# Patient Record
Sex: MALE | Race: WHITE | NOT HISPANIC OR LATINO | Employment: PART TIME | URBAN - NONMETROPOLITAN AREA
[De-identification: names, ages, dates, MRNs, and addresses within clinical notes are randomized per-mention and may not be internally consistent; named-entity substitution may affect disease eponyms.]

---

## 2017-05-30 ENCOUNTER — APPOINTMENT (OUTPATIENT)
Dept: GENERAL RADIOLOGY | Facility: HOSPITAL | Age: 27
End: 2017-05-30

## 2017-05-30 ENCOUNTER — HOSPITAL ENCOUNTER (EMERGENCY)
Facility: HOSPITAL | Age: 27
Discharge: HOME | End: 2017-05-30
Attending: FAMILY MEDICINE | Admitting: NURSE PRACTITIONER

## 2017-05-30 VITALS
BODY MASS INDEX: 26.11 KG/M2 | HEIGHT: 75 IN | HEART RATE: 80 BPM | RESPIRATION RATE: 18 BRPM | OXYGEN SATURATION: 98 % | WEIGHT: 210 LBS | SYSTOLIC BLOOD PRESSURE: 120 MMHG | DIASTOLIC BLOOD PRESSURE: 78 MMHG | TEMPERATURE: 97.4 F

## 2017-05-30 DIAGNOSIS — M79.672 LEFT FOOT PAIN: Primary | ICD-10-CM

## 2017-05-30 PROCEDURE — 73630 X-RAY EXAM OF FOOT: CPT

## 2017-05-30 PROCEDURE — 99283 EMERGENCY DEPT VISIT LOW MDM: CPT

## 2017-05-30 RX ORDER — NAPROXEN 500 MG/1
500 TABLET ORAL 2 TIMES DAILY PRN
Qty: 20 TABLET | Refills: 0 | OUTPATIENT
Start: 2017-05-30 | End: 2019-10-17

## 2018-07-11 ENCOUNTER — HOSPITAL ENCOUNTER (EMERGENCY)
Facility: HOSPITAL | Age: 28
Discharge: HOME | End: 2018-07-11
Attending: EMERGENCY MEDICINE | Admitting: EMERGENCY MEDICINE

## 2018-07-11 ENCOUNTER — APPOINTMENT (OUTPATIENT)
Dept: GENERAL RADIOLOGY | Facility: HOSPITAL | Age: 28
End: 2018-07-11

## 2018-07-11 VITALS
DIASTOLIC BLOOD PRESSURE: 83 MMHG | OXYGEN SATURATION: 98 % | RESPIRATION RATE: 20 BRPM | HEART RATE: 85 BPM | WEIGHT: 190 LBS | SYSTOLIC BLOOD PRESSURE: 158 MMHG | TEMPERATURE: 98 F | BODY MASS INDEX: 24.38 KG/M2 | HEIGHT: 74 IN

## 2018-07-11 DIAGNOSIS — S62.625A CLOSED DISPLACED FRACTURE OF MIDDLE PHALANX OF LEFT RING FINGER, INITIAL ENCOUNTER: ICD-10-CM

## 2018-07-11 DIAGNOSIS — S62.657A CLOSED NONDISPLACED FRACTURE OF MIDDLE PHALANX OF LEFT LITTLE FINGER, INITIAL ENCOUNTER: Primary | ICD-10-CM

## 2018-07-11 PROCEDURE — 73090 X-RAY EXAM OF FOREARM: CPT

## 2018-07-11 PROCEDURE — 99283 EMERGENCY DEPT VISIT LOW MDM: CPT

## 2018-07-11 PROCEDURE — 73130 X-RAY EXAM OF HAND: CPT

## 2018-07-11 PROCEDURE — 73110 X-RAY EXAM OF WRIST: CPT

## 2018-07-11 RX ORDER — HYDROCODONE BITARTRATE AND ACETAMINOPHEN 7.5; 325 MG/1; MG/1
1 TABLET ORAL ONCE
Status: COMPLETED | OUTPATIENT
Start: 2018-07-11 | End: 2018-07-11

## 2018-07-11 RX ORDER — HYDROCODONE BITARTRATE AND ACETAMINOPHEN 7.5; 325 MG/1; MG/1
1 TABLET ORAL EVERY 6 HOURS PRN
Qty: 12 TABLET | Refills: 0 | Status: SHIPPED | OUTPATIENT
Start: 2018-07-11 | End: 2018-07-14

## 2018-07-11 RX ADMIN — HYDROCODONE BITARTRATE AND ACETAMINOPHEN 1 TABLET: 7.5; 325 TABLET ORAL at 13:25

## 2018-07-11 NOTE — ED PROVIDER NOTES
Subjective     History provided by:  Patient   used: No    Hand Injury   Location:  Hand and wrist  Wrist location:  L wrist  Hand location:  L hand and dorsum of L hand  Injury: yes    Time since incident:  1 day  Mechanism of injury: crush    Crush injury:     Mechanism:  Falling object (rock)    Approximate weight of object:  Unknown  Handedness:  Right-handed  Dislocation: no    Foreign body present:  No foreign bodies  Tetanus status:  Up to date  Prior injury to area:  No  Relieved by:  Nothing  Worsened by:  Movement  Ineffective treatments:  None tried  Associated symptoms: decreased range of motion, stiffness and swelling    Associated symptoms: no back pain, no fatigue, no fever, no muscle weakness, no neck pain, no numbness and no tingling    Risk factors: no concern for non-accidental trauma, no known bone disorder, no frequent fractures and no recent illness        Review of Systems   Constitutional: Negative.  Negative for fatigue and fever.   HENT: Negative.    Eyes: Negative.    Respiratory: Negative.    Cardiovascular: Negative.    Gastrointestinal: Negative.    Endocrine: Negative.    Genitourinary: Negative.    Musculoskeletal: Positive for stiffness. Negative for arthralgias, back pain, gait problem, joint swelling, myalgias, neck pain and neck stiffness.        Left hand and finger pain   Skin: Negative.    Allergic/Immunologic: Negative.    Neurological: Negative.    Hematological: Negative.    Psychiatric/Behavioral: Negative.        History reviewed. No pertinent past medical history.    Allergies   Allergen Reactions   • Penicillins Itching       Past Surgical History:   Procedure Laterality Date   • APPENDECTOMY     • MYRINGOTOMY W/ TUBES         History reviewed. No pertinent family history.    Social History     Social History   • Marital status: Single     Social History Main Topics   • Smoking status: Current Every Day Smoker     Packs/day: 1.00     Years: 10.00   •  Smokeless tobacco: Former User   • Alcohol use Yes      Comment: socially   • Drug use: No   • Sexual activity: Defer     Other Topics Concern   • Not on file           Objective   Physical Exam   Constitutional: He is oriented to person, place, and time. He appears well-developed and well-nourished. No distress.   HENT:   Head: Normocephalic and atraumatic.   Mouth/Throat: No oropharyngeal exudate.   Eyes: Conjunctivae and EOM are normal. Pupils are equal, round, and reactive to light. Right eye exhibits no discharge. Left eye exhibits no discharge. No scleral icterus.   Neck: Normal range of motion. Neck supple. No JVD present. No tracheal deviation present. No thyromegaly present.   Cardiovascular: Normal rate, regular rhythm, normal heart sounds and intact distal pulses.  Exam reveals no gallop and no friction rub.    No murmur heard.  Pulmonary/Chest: Effort normal and breath sounds normal. No stridor. No respiratory distress. He has no wheezes. He has no rales. He exhibits no tenderness.   Abdominal: Soft. Bowel sounds are normal. He exhibits no distension and no mass. There is no tenderness. There is no rebound and no guarding. No hernia.   Musculoskeletal: He exhibits tenderness. He exhibits no edema or deformity.        Left hand: He exhibits decreased range of motion, tenderness, bony tenderness and swelling. He exhibits normal two-point discrimination, normal capillary refill, no deformity and no laceration. Normal sensation noted. Decreased sensation is not present in the ulnar distribution, is not present in the medial redistribution and is not present in the radial distribution. Normal strength noted. He exhibits no finger abduction, no thumb/finger opposition and no wrist extension trouble.        Hands:  Lymphadenopathy:     He has no cervical adenopathy.   Neurological: He is alert and oriented to person, place, and time. He has normal reflexes. He displays normal reflexes. No cranial nerve deficit  or sensory deficit. He exhibits normal muscle tone. Coordination normal.   Skin: Skin is warm and dry. Capillary refill takes less than 2 seconds. No rash noted. He is not diaphoretic. No erythema. No pallor.   Psychiatric: He has a normal mood and affect. His behavior is normal. Judgment and thought content normal.   Nursing note and vitals reviewed.      Procedures           ED Course      Xr Forearm 2 View Left    Result Date: 7/11/2018  Narrative: Left forearm two view on 7/11/2018 CLINICAL INDICATION: Crush injury at work, pain COMPARISON: None FINDINGS: There are no fractures. Visualized joints are well aligned. No joint effusion is noted in the elbow to suggest an occult fracture. No bony abnormality is noted.     Impression: No acute bony abnormality. Electronically signed by:  Azeem Ramírez  7/11/2018 12:38 PM CDT Workstation: [id number]    Xr Wrist 3+ View Left    Result Date: 7/11/2018  Narrative: Left wrist four view on 7/11/2018 CLINICAL INDICATION: Crush injury at work, wrist pain COMPARISON: None FINDINGS: There are no fractures. Visualized joints are well aligned. No bony abnormality is noted.     Impression: No acute bony abnormality. Electronically signed by:  Azeem Ramírez  7/11/2018 12:37 PM CDT Workstation: [id number]    Xr Hand 3+ View Left    Result Date: 7/11/2018  Narrative: Left hand three view on 7/11/2018 CLINICAL INDICATION: Left hand pain after crush injury COMPARISON: None FINDINGS: There is an age-indeterminate but possibly acute intra-articular fracture involving the volar base of the fifth middle phalanx that is minimally displaced. There is likely acute slightly comminuted transverse fracture of the metaphyseal base of the fourth middle phalanx extending into the volar plate. This fracture is not well visualized on lateral view. No other fracture is noted. Visualized joints are well aligned.     Impression: Age-indeterminate but possibly both acute fractures of the  base of the fourth and fifth middle phalanx. Recommend correlation with location of pain and if indicated repeat lateral views with the fingers extended would be useful. Electronically signed by:  Azeem Ramírez  7/11/2018 12:43 PM CDT Workstation: [id number]                [facility]      Final diagnoses:   Closed nondisplaced fracture of middle phalanx of left little finger, initial encounter   Closed displaced fracture of middle phalanx of left ring finger, initial encounter            DEMETRIA Mendoza  07/11/18 3994

## 2018-07-18 ENCOUNTER — OFFICE VISIT (OUTPATIENT)
Dept: ORTHOPEDIC SURGERY | Facility: CLINIC | Age: 28
End: 2018-07-18

## 2018-07-18 VITALS — HEIGHT: 74 IN | BODY MASS INDEX: 24.38 KG/M2 | WEIGHT: 190 LBS

## 2018-07-18 DIAGNOSIS — M79.642 LEFT HAND PAIN: Primary | ICD-10-CM

## 2018-07-18 DIAGNOSIS — S62.629A CLOSED FRACTURE OF BASE OF MIDDLE PHALANX OF FINGER, INITIAL ENCOUNTER: ICD-10-CM

## 2018-07-18 PROBLEM — S62.621A: Status: ACTIVE | Noted: 2018-07-18

## 2018-07-18 PROCEDURE — 99203 OFFICE O/P NEW LOW 30 MIN: CPT | Performed by: ORTHOPAEDIC SURGERY

## 2018-07-18 PROCEDURE — 26720 TREAT FINGER FRACTURE EACH: CPT | Performed by: ORTHOPAEDIC SURGERY

## 2018-07-18 RX ORDER — HYDROCODONE BITARTRATE AND ACETAMINOPHEN 5; 325 MG/1; MG/1
1 TABLET ORAL EVERY 6 HOURS PRN
Qty: 30 TABLET | Refills: 0 | OUTPATIENT
Start: 2018-07-18 | End: 2019-10-17

## 2018-07-18 NOTE — PROGRESS NOTES
"Thomas Ge is a 27 y.o. male   Primary provider:  No Known Provider       Chief Complaint   Patient presents with   • Left Hand - Numbness, Pain       HISTORY OF PRESENT ILLNESS: Patient presents as ER follow up- injury to left hand. Date of injury- 7/10/2018. Patient states injury occurred after accidently crushing his hand in between 2 rocks while at work.  Patient states he has continued to have severe pain in his wrist, hand and fingers. Patient states he notices upon occasion there is numbness present. Patient was given naproxen by the ER which he states \"does not provide any pain relief\". Patient has xray's available in chart for review. He was treated in a splint.  History of fracture when he was 12 years old.    History of Present Illness     CONCURRENT MEDICAL HISTORY:    History reviewed. No pertinent past medical history.    Allergies   Allergen Reactions   • Penicillins Itching         Current Outpatient Prescriptions:   •  HYDROcodone-acetaminophen (NORCO) 5-325 MG per tablet, Take 1 tablet by mouth Every 6 (Six) Hours As Needed for Moderate Pain ., Disp: 30 tablet, Rfl: 0  •  naproxen (NAPROSYN) 500 MG tablet, Take 1 tablet by mouth 2 (Two) Times a Day As Needed for Mild Pain (1-3)., Disp: 20 tablet, Rfl: 0    Past Surgical History:   Procedure Laterality Date   • APPENDECTOMY     • MYRINGOTOMY W/ TUBES         History reviewed. No pertinent family history.     Social History     Social History   • Marital status: Single     Spouse name: N/A   • Number of children: N/A   • Years of education: N/A     Occupational History   • Not on file.     Social History Main Topics   • Smoking status: Current Every Day Smoker     Packs/day: 1.00     Years: 10.00   • Smokeless tobacco: Former User   • Alcohol use Yes      Comment: socially   • Drug use: No   • Sexual activity: Defer     Other Topics Concern   • Not on file     Social History Narrative   • No narrative on file        Review of Systems " "  Constitutional: Positive for activity change. Negative for chills and fever.   HENT: Negative.  Negative for facial swelling.    Eyes: Negative.  Negative for photophobia.   Respiratory: Negative.  Negative for apnea and shortness of breath.    Cardiovascular: Negative.  Negative for chest pain and leg swelling.   Gastrointestinal: Negative.  Negative for abdominal pain, nausea and vomiting.   Endocrine: Negative.    Genitourinary: Negative.  Negative for dysuria.   Musculoskeletal: Positive for joint swelling.   Skin: Negative.  Negative for color change and rash.   Allergic/Immunologic: Negative.    Neurological: Positive for numbness. Negative for seizures and syncope.   Hematological: Negative.    Psychiatric/Behavioral: Negative.  Negative for behavioral problems and dysphoric mood.       PHYSICAL EXAMINATION:       Ht 188 cm (74\")   Wt 86.2 kg (190 lb)   BMI 24.39 kg/m²     Physical Exam   Constitutional: He is oriented to person, place, and time. He appears well-developed and well-nourished.   HENT:   Head: Normocephalic and atraumatic.   Eyes: Pupils are equal, round, and reactive to light. EOM are normal.   Neck: Neck supple. No tracheal deviation present.   Pulmonary/Chest: Effort normal.   Musculoskeletal: He exhibits edema and tenderness. He exhibits no deformity.   Neurological: He is alert and oriented to person, place, and time.   Skin: Skin is warm and dry. No erythema.   Psychiatric: He has a normal mood and affect. Thought content normal.   Vitals reviewed.      GAIT:     [x]  Normal  []  Antalgic    Assistive device: [x]  None  []  Walker     []  Crutches  []  Cane     []  Wheelchair  []  Stretcher    Ortho Exam  Tender PIP joints over the fourth and fifth.  Decreased motion.  He is neurologically intact distally at this point.  He does have some tenderness to wrist and some ecchymosis about the fourth finger.  No obvious deformity.    Xr Forearm 2 View Left    Result Date: " 7/11/2018  Narrative: Left forearm two view on 7/11/2018 CLINICAL INDICATION: Crush injury at work, pain COMPARISON: None FINDINGS: There are no fractures. Visualized joints are well aligned. No joint effusion is noted in the elbow to suggest an occult fracture. No bony abnormality is noted.     Impression: No acute bony abnormality. Electronically signed by:  Azeem Ramírez  7/11/2018 12:38 PM CDT Workstation: [id number]    Xr Wrist 3+ View Left    Result Date: 7/11/2018  Narrative: Left wrist four view on 7/11/2018 CLINICAL INDICATION: Crush injury at work, wrist pain COMPARISON: None FINDINGS: There are no fractures. Visualized joints are well aligned. No bony abnormality is noted.     Impression: No acute bony abnormality. Electronically signed by:  Azeem Ramírez  7/11/2018 12:37 PM CDT Workstation: [id number]    Xr Hand 3+ View Left    Result Date: 7/11/2018  Narrative: Left hand three view on 7/11/2018 CLINICAL INDICATION: Left hand pain after crush injury COMPARISON: None FINDINGS: There is an age-indeterminate but possibly acute intra-articular fracture involving the volar base of the fifth middle phalanx that is minimally displaced. There is likely acute slightly comminuted transverse fracture of the metaphyseal base of the fourth middle phalanx extending into the volar plate. This fracture is not well visualized on lateral view. No other fracture is noted. Visualized joints are well aligned.     Impression: Age-indeterminate but possibly both acute fractures of the base of the fourth and fifth middle phalanx. Recommend correlation with location of pain and if indicated repeat lateral views with the fingers extended would be useful. Electronically signed by:  Azeem Ramírez  7/11/2018 12:43 PM CDT Workstation: [id number]        I reviewed the x-rays as above and agree with the findings  ASSESSMENT:    Diagnoses and all orders for this visit:    Left hand pain  -      HYDROcodone-acetaminophen (NORCO) 5-325 MG per tablet; Take 1 tablet by mouth Every 6 (Six) Hours As Needed for Moderate Pain .    Closed fracture of base of middle phalanx of finger, initial encounter          PLAN he has fractures of the fourth and fifth middle phalanx.  There are intra-articularly but had minimal displacement at this point.  I will remove the arm gutter splint that he has buddy tape these and start range of motion.  He has a lot of swelling of explaining the biggest problems were to be stiffness with . we x-rayed these fingers in 2 weeks.  He will call sooner with any problems.  I will give him a work note keeping him off work until that time.  He is left-hand dominant.    No Follow-up on file.    Chacorta Arenas MD

## 2018-07-31 DIAGNOSIS — S62.629A CLOSED FRACTURE OF BASE OF MIDDLE PHALANX OF FINGER, INITIAL ENCOUNTER: Primary | ICD-10-CM

## 2018-08-01 ENCOUNTER — OFFICE VISIT (OUTPATIENT)
Dept: ORTHOPEDIC SURGERY | Facility: CLINIC | Age: 28
End: 2018-08-01

## 2018-08-01 DIAGNOSIS — M79.642 LEFT HAND PAIN: Primary | ICD-10-CM

## 2018-08-01 DIAGNOSIS — S62.629D CLOSED FRACTURE OF BASE OF MIDDLE PHALANX OF FINGER WITH ROUTINE HEALING, SUBSEQUENT ENCOUNTER: ICD-10-CM

## 2018-08-01 DIAGNOSIS — S62.621A DISPLACED FRACTURE OF MIDDLE PHALANX OF LEFT INDEX FINGER, INITIAL ENCOUNTER FOR CLOSED FRACTURE: ICD-10-CM

## 2018-08-01 PROCEDURE — 99024 POSTOP FOLLOW-UP VISIT: CPT | Performed by: ORTHOPAEDIC SURGERY

## 2018-08-01 NOTE — PROGRESS NOTES
Thomas Ge is a 27 y.o. male returns for     Chief Complaint   Patient presents with   • Left Wrist - Pain   • Left Hand - Pain       HISTORY OF PRESENT ILLNESS: Patient presents for recheck- medial phalanx FX of left middle finger. Date of injury- 07/11/2018. Patient states he has followed all directives given since his last office visit. Patient was sent to [facility] upon arrival. He is now 3 weeks out doing pretty well his motion has increased since his crush injury to his hand.       CONCURRENT MEDICAL HISTORY:    History reviewed. No pertinent past medical history.    Allergies   Allergen Reactions   • Penicillins Itching         Current Outpatient Prescriptions:   •  HYDROcodone-acetaminophen (NORCO) 5-325 MG per tablet, Take 1 tablet by mouth Every 6 (Six) Hours As Needed for Moderate Pain ., Disp: 30 tablet, Rfl: 0  •  naproxen (NAPROSYN) 500 MG tablet, Take 1 tablet by mouth 2 (Two) Times a Day As Needed for Mild Pain (1-3)., Disp: 20 tablet, Rfl: 0    Past Surgical History:   Procedure Laterality Date   • APPENDECTOMY     • MYRINGOTOMY W/ TUBES         ROS  No fevers or chills.  No chest pain or shortness of air.  No GI or  disturbances.    PHYSICAL EXAMINATION:       There were no vitals taken for this visit.    Physical Exam    GAIT:     [x]  Normal  []  Antalgic    Assistive device: [x]  None  []  Walker     []  Crutches  []  Cane     []  Wheelchair  []  Stretcher    Ortho Exam  Stiffness and tenderness about the PIP of the fourth and fifth finger on the left hand.  Still has some tenderness about the wrist.  The wrist is moving pretty well he does have a little flexion deformity of the fourth PIP limited motion of the fifth.  He is neurovascularly intact.    Xr Forearm 2 View Left    Result Date: 7/11/2018  Narrative: Left forearm two view on 7/11/2018 CLINICAL INDICATION: Crush injury at work, pain COMPARISON: None FINDINGS: There are no fractures. Visualized joints are well aligned. No joint  effusion is noted in the elbow to suggest an occult fracture. No bony abnormality is noted.     Impression: No acute bony abnormality. Electronically signed by:  Azeem Ramírez  7/11/2018 12:38 PM CDT Workstation: [id number]    Xr Wrist 3+ View Left    Result Date: 7/11/2018  Narrative: Left wrist four view on 7/11/2018 CLINICAL INDICATION: Crush injury at work, wrist pain COMPARISON: None FINDINGS: There are no fractures. Visualized joints are well aligned. No bony abnormality is noted.     Impression: No acute bony abnormality. Electronically signed by:  Azeem Ramírez  7/11/2018 12:37 PM CDT Workstation: [id number]    Xr Hand 3+ View Left    Result Date: 7/11/2018  Narrative: Left hand three view on 7/11/2018 CLINICAL INDICATION: Left hand pain after crush injury COMPARISON: None FINDINGS: There is an age-indeterminate but possibly acute intra-articular fracture involving the volar base of the fifth middle phalanx that is minimally displaced. There is likely acute slightly comminuted transverse fracture of the metaphyseal base of the fourth middle phalanx extending into the volar plate. This fracture is not well visualized on lateral view. No other fracture is noted. Visualized joints are well aligned.     Impression: Age-indeterminate but possibly both acute fractures of the base of the fourth and fifth middle phalanx. Recommend correlation with location of pain and if indicated repeat lateral views with the fingers extended would be useful. Electronically signed by:  Azeem Ramírez  7/11/2018 12:43 PM CDT Workstation: [id number]    Repeat x-rays show intra-articular fracture middle phalanx of the PIP joint of the small finger.  The fracture of the fourth finger does have some angulation to it but it doesn't appear to be is intra-articular.  It may have changed a little bit from previously.        ASSESSMENT:    Diagnoses and all orders for this visit:    Left hand pain    Closed fracture of  base of middle phalanx of finger with routine healing, subsequent encounter    Displaced fracture of middle phalanx of left index finger, initial encounter for closed fracture          PLAN I have discussed with them the flexion deformity of the fourth middle phalanx.  This is non-intra-articular abnormalities , but it is certainly straighten the middle phalanx and then we would have to plate or pin ,and I'm not really sure is going to increase his function.  He does understand that there is some angulation, but he can actually  get his hand flat and actually looks pretty good.  He understands we'll check him back in 3 weeks x-raying arrival.  He will have a work excuse until then.    No Follow-up on file.    Chcaorta Arenas MD

## 2018-08-02 ENCOUNTER — TELEPHONE (OUTPATIENT)
Dept: ORTHOPEDIC SURGERY | Facility: CLINIC | Age: 28
End: 2018-08-02

## 2018-08-02 NOTE — TELEPHONE ENCOUNTER
Tried contacting patient with number provided in EPIC. Number was not reachable.     Patient's new work excuse is ready and available for .    Will try contacting patient again before end of business day.    CFB

## 2018-08-14 ENCOUNTER — TELEPHONE (OUTPATIENT)
Dept: ORTHOPEDIC SURGERY | Facility: CLINIC | Age: 28
End: 2018-08-14

## 2018-08-14 NOTE — TELEPHONE ENCOUNTER
Contacted Inga Ge and informed the patient will need to be seen in the office in order to be able to change/set restrictions and return back to work date. Patient agreed and appointment was made.     MILTONB

## 2018-08-14 NOTE — TELEPHONE ENCOUNTER
PATIENT'S MOTHER, MARGARETTE CORDERO, CALLED STATING HE HAS AN APPOINTMENT ON 08/22/18 BUT HE WANTS TO RETURN TO WORK SOONER IF POSSIBLE EVEN IF IT IS WITH RESTRICTIONS.    [phone]

## 2018-08-15 DIAGNOSIS — S62.629D CLOSED FRACTURE OF BASE OF MIDDLE PHALANX OF FINGER WITH ROUTINE HEALING, SUBSEQUENT ENCOUNTER: Primary | ICD-10-CM

## 2019-10-17 ENCOUNTER — HOSPITAL ENCOUNTER (EMERGENCY)
Facility: HOSPITAL | Age: 29
Discharge: HOME | End: 2019-10-17
Attending: EMERGENCY MEDICINE | Admitting: EMERGENCY MEDICINE

## 2019-10-17 VITALS
HEIGHT: 74 IN | WEIGHT: 248.8 LBS | OXYGEN SATURATION: 95 % | DIASTOLIC BLOOD PRESSURE: 95 MMHG | BODY MASS INDEX: 31.93 KG/M2 | HEART RATE: 75 BPM | TEMPERATURE: 97.6 F | RESPIRATION RATE: 18 BRPM | SYSTOLIC BLOOD PRESSURE: 155 MMHG

## 2019-10-17 DIAGNOSIS — H16.133 WELDERS' FLASH, BILATERAL: Primary | ICD-10-CM

## 2019-10-17 PROCEDURE — 99284 EMERGENCY DEPT VISIT MOD MDM: CPT

## 2019-10-17 RX ORDER — HYDROCODONE BITARTRATE AND ACETAMINOPHEN 5; 325 MG/1; MG/1
1 TABLET ORAL ONCE
Status: COMPLETED | OUTPATIENT
Start: 2019-10-17 | End: 2019-10-17

## 2019-10-17 RX ORDER — PROPARACAINE HYDROCHLORIDE 5 MG/ML
SOLUTION/ DROPS OPHTHALMIC
Status: COMPLETED
Start: 2019-10-17 | End: 2019-10-17

## 2019-10-17 RX ORDER — PROPARACAINE HYDROCHLORIDE 5 MG/ML
2 SOLUTION/ DROPS OPHTHALMIC ONCE
Status: COMPLETED | OUTPATIENT
Start: 2019-10-17 | End: 2019-10-17

## 2019-10-17 RX ORDER — PURIFIED WATER 986 MG/ML
4 SOLUTION OPHTHALMIC ONCE
Status: COMPLETED | OUTPATIENT
Start: 2019-10-17 | End: 2019-10-17

## 2019-10-17 RX ORDER — POLYMYXIN B SULFATE AND TRIMETHOPRIM 1; 10000 MG/ML; [USP'U]/ML
1 SOLUTION OPHTHALMIC EVERY 4 HOURS
Qty: 10 ML | Refills: 0 | Status: SHIPPED | OUTPATIENT
Start: 2019-10-17 | End: 2019-10-24

## 2019-10-17 RX ORDER — NEOMYCIN SULFATE, POLYMYXIN B SULFATE, BACITRACIN ZINC, HYDROCORTISONE 3.5; 10000; 400; 1 MG/G; [USP'U]/G; [USP'U]/G; MG/G
1 OINTMENT OPHTHALMIC 4 TIMES DAILY
Status: DISCONTINUED | OUTPATIENT
Start: 2019-10-17 | End: 2019-10-17

## 2019-10-17 RX ORDER — HYDROCODONE BITARTRATE AND ACETAMINOPHEN 5; 325 MG/1; MG/1
1 TABLET ORAL EVERY 6 HOURS PRN
Qty: 12 TABLET | Refills: 0 | Status: SHIPPED | OUTPATIENT
Start: 2019-10-17 | End: 2019-10-20

## 2019-10-17 RX ORDER — POLYMYXIN B SULFATE AND TRIMETHOPRIM 1; 10000 MG/ML; [USP'U]/ML
1 SOLUTION OPHTHALMIC
Status: COMPLETED | OUTPATIENT
Start: 2019-10-17 | End: 2019-10-17

## 2019-10-17 RX ADMIN — PURIFIED WATER 4 DROP: 986 SOLUTION OPHTHALMIC at 14:30

## 2019-10-17 RX ADMIN — POLYMYXIN B SULFATE AND TRIMETHOPRIM SULFATE 1 DROP: 10000; 1 SOLUTION/ DROPS OPHTHALMIC at 14:31

## 2019-10-17 RX ADMIN — FLUORESCEIN SODIUM 1 STRIP: 1 STRIP OPHTHALMIC at 13:50

## 2019-10-17 RX ADMIN — PROPARACAINE HYDROCHLORIDE 2 DROP: 5 SOLUTION/ DROPS OPHTHALMIC at 13:50

## 2019-10-17 RX ADMIN — HYDROCODONE BITARTRATE AND ACETAMINOPHEN 1 TABLET: 5; 325 TABLET ORAL at 14:12

## 2019-10-17 NOTE — ED NOTES
This RN went into pts room explaining the long wait, pt and family verbalized understanding and that they are willing to wait at this time.      Mimi Chung RN  10/17/19 2735

## 2019-10-17 NOTE — ED PROVIDER NOTES
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 Sudden onset burning pain and blurred vision. No foreign body sensation.  States this has happened to him before. He reports he was only wearing basic eye protection.     Family history, surgical history, social history, current medications and allergies are reviewed with the patient and triage documentation and vitals are reviewed.           History provided by:  Significant other and patient   used: No        Review of Systems   Eyes: Positive for photophobia, pain, redness and visual disturbance. Negative for discharge and itching.   All other systems reviewed and are negative.      History reviewed. No pertinent past medical history.    Allergies   Allergen Reactions   • Penicillins Itching       Past Surgical History:   Procedure Laterality Date   • APPENDECTOMY     • MYRINGOTOMY W/ TUBES         History reviewed. No pertinent family history.    Social History     Socioeconomic History   • Marital status: Legally      Spouse name: Not on file   • Number of children: Not on file   • Years of education: Not on file   • Highest education level: Not on file   Tobacco Use   • Smoking status: Current Every Day Smoker     Packs/day: 0.50     Years: 10.00     Pack years: 5.00   • Smokeless tobacco: Former User   Substance and Sexual Activity   • Alcohol use: Yes     Comment: socially   • Drug use: No   • Sexual activity: Defer           Objective   Physical Exam   Constitutional: He is oriented to person, place, and time. He appears well-developed and well-nourished. No distress.   HENT:   Head: Normocephalic and atraumatic.   Eyes: EOM and lids are normal. Pupils are equal, round, and reactive to light. Lids are everted and swept, no foreign bodies found. Right eye exhibits no chemosis, no discharge and no exudate. No foreign body present in the right eye. Left eye exhibits no  chemosis, no discharge and no exudate. No foreign body present in the left eye. Right conjunctiva is injected. Left conjunctiva is injected. No scleral icterus.   Slit lamp exam:       The right eye shows no corneal abrasion, no corneal ulcer, no foreign body and no fluorescein uptake.        The left eye shows no corneal abrasion, no corneal ulcer, no foreign body and no fluorescein uptake.   Cardiovascular: Normal rate and regular rhythm.   Pulmonary/Chest: Effort normal and breath sounds normal.   Neurological: He is alert and oriented to person, place, and time.   Skin: Skin is warm and dry. Capillary refill takes less than 2 seconds. He is not diaphoretic.   Nursing note and vitals reviewed.      Procedures  none         ED Course    Labs Reviewed - No data to display  No results found.        MDM  Number of Diagnoses or Management Options  Welders' flash, bilateral:   Patient Progress  Patient progress: stable    Bilateral welders flash. Given short course of pain medication and started on eye drops. Advised to follow-up closely with opthalmology. Agreeable to plan and discharge.      Final diagnoses:   Welders' flash, bilateral              Rogers Rebollar,   10/19/19 0504

## 2019-10-17 NOTE — DISCHARGE INSTRUCTIONS
Please return with new or worsening symptoms.  Use medications provided as directed.  Follow-up with the vision center at [facility] as discussed today and for follow-up.

## 2019-10-17 NOTE — ED NOTES
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 Pt reports that he was working without a mask on yesterday as well. Pt reports he took Tylenol at 0830 today,     Mimi Chung RN  10/17/19 0202

## 2023-09-22 ENCOUNTER — APPOINTMENT (OUTPATIENT)
Dept: GENERAL RADIOLOGY | Facility: HOSPITAL | Age: 33
End: 2023-09-22
Payer: COMMERCIAL

## 2023-09-22 ENCOUNTER — HOSPITAL ENCOUNTER (EMERGENCY)
Facility: HOSPITAL | Age: 33
Discharge: HOME | End: 2023-09-22
Attending: FAMILY MEDICINE
Payer: COMMERCIAL

## 2023-09-22 VITALS
BODY MASS INDEX: 32.08 KG/M2 | TEMPERATURE: 97.8 F | WEIGHT: 250 LBS | RESPIRATION RATE: 18 BRPM | HEIGHT: 74 IN | DIASTOLIC BLOOD PRESSURE: 59 MMHG | HEART RATE: 68 BPM | OXYGEN SATURATION: 96 % | SYSTOLIC BLOOD PRESSURE: 109 MMHG
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
## 2023-09-22 DIAGNOSIS — M54.2 NECK PAIN: ICD-10-CM

## 2023-09-22 DIAGNOSIS — M25.512 ACUTE PAIN OF LEFT SHOULDER: ICD-10-CM

## 2023-09-22 PROCEDURE — 73030 X-RAY EXAM OF SHOULDER: CPT

## 2023-09-22 PROCEDURE — 72040 X-RAY EXAM NECK SPINE 2-3 VW: CPT

## 2023-09-22 PROCEDURE — 99283 EMERGENCY DEPT VISIT LOW MDM: CPT

## 2023-09-22 PROCEDURE — 71101 X-RAY EXAM UNILAT RIBS/CHEST: CPT

## 2023-09-22 RX ORDER — HYDROCODONE BITARTRATE AND ACETAMINOPHEN 5; 325 MG/1; MG/1
1 TABLET ORAL ONCE
Status: COMPLETED | OUTPATIENT
Start: 2023-09-22 | End: 2023-09-22

## 2023-09-22 RX ADMIN — HYDROCODONE BITARTRATE AND ACETAMINOPHEN 1 TABLET: 5; 325 TABLET ORAL at 22:02
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
